# Patient Record
(demographics unavailable — no encounter records)

---

## 2025-07-11 NOTE — HEALTH RISK ASSESSMENT
[0] : 2) Feeling down, depressed, or hopeless: Not at all (0) [PHQ-2 Negative - No further assessment needed] : PHQ-2 Negative - No further assessment needed [[id number]] : 0 [Former] : Former

## 2025-07-11 NOTE — HISTORY OF PRESENT ILLNESS
[FreeTextEntry8] : 58 year old female presents c/o 2 month h/o pain localized to her low back and right hip region. She denies any recent injury or trauma. Pain does not radiate down her legs. No associated numbness and/or tingling. The pain has started to awaken her at night. She has tried taking Ibuprofen with some relief. She reports h/o herniated disc in the past.

## 2025-07-11 NOTE — PHYSICAL EXAM
[No Acute Distress] : no acute distress [Well Nourished] : well nourished [Well Developed] : well developed [Normal Appearance] : was normal in appearance [Neck Supple] : was supple [No Respiratory Distress] : no respiratory distress  [Clear to Auscultation] : lungs were clear to auscultation bilaterally [Normal Rate] : normal rate  [Regular Rhythm] : with a regular rhythm [Normal S1, S2] : normal S1 and S2 [No Murmur] : no murmur heard [No Spinal Tenderness] : no spinal tenderness [Grossly Normal Strength/Tone] : grossly normal strength/tone [No Rash] : no rash [No Focal Deficits] : no focal deficits [Alert and Oriented x3] : oriented to person, place, and time [de-identified] : no tenderness to palpation to lower back, lumbosacral region

## 2025-07-11 NOTE — ASSESSMENT
[FreeTextEntry1] : will refer patient for x-rays for further evaluation can c/w Ibuprofen PRN for pain for now will refer patient to physiatry for further evaluation consider MRI of lumbar spine and right hip if x-rays are inconclusive consider trial of physical therapy  return as needed and advised to schedule and return for annual physical